# Patient Record
(demographics unavailable — no encounter records)

---

## 2024-10-28 NOTE — PHYSICAL EXAM

## 2024-10-28 NOTE — DISCUSSION/SUMMARY
[FreeTextEntry1] : Continue balanced diet with all food groups. Brush teeth twice a day with toothbrush. Recommend visit to dentist. Maintain consistent daily routines and sleep schedule. Personal hygiene, puberty, and sexual health reviewed. Risky behaviors assessed. School discussed. Limit screen time to no more than 2 hours per day. Encourage physical activity. Return 1 year for routine well child check. dentist

## 2024-10-28 NOTE — HISTORY OF PRESENT ILLNESS
[Mother] : mother [No] : Patient does not go to dentist yearly [Up to date] : Up to date [NO] : No [Uses electronic nicotine delivery system] : does not use electronic nicotine delivery system [Exposure to electronic nicotine delivery system] : no exposure to electronic nicotine delivery system [Uses tobacco] : does not use tobacco [Exposure to tobacco] : no exposure to tobacco [Uses drugs] : does not use drugs  [Exposure to drugs] : no exposure to drugs [Drinks alcohol] : does not drink alcohol [Exposure to alcohol] : no exposure to alcohol [de-identified] : refuses flu [FreeTextEntry1] : CHAVA CORREA is a 13 year here for well  receives Lazer from derm for vitiligo

## 2024-10-28 NOTE — HISTORY OF PRESENT ILLNESS
[Mother] : mother [No] : Patient does not go to dentist yearly [Up to date] : Up to date [NO] : No [Uses electronic nicotine delivery system] : does not use electronic nicotine delivery system [Exposure to electronic nicotine delivery system] : no exposure to electronic nicotine delivery system [Uses tobacco] : does not use tobacco [Exposure to tobacco] : no exposure to tobacco [Uses drugs] : does not use drugs  [Exposure to drugs] : no exposure to drugs [Drinks alcohol] : does not drink alcohol [Exposure to alcohol] : no exposure to alcohol [de-identified] : refuses flu [FreeTextEntry1] : CHAVA CORREA is a 13 year here for well  receives Lazer from derm for vitiligo

## 2024-10-28 NOTE — PHYSICAL EXAM
